# Patient Record
Sex: FEMALE | Race: OTHER | Employment: OTHER | URBAN - METROPOLITAN AREA
[De-identification: names, ages, dates, MRNs, and addresses within clinical notes are randomized per-mention and may not be internally consistent; named-entity substitution may affect disease eponyms.]

---

## 2019-08-27 NOTE — PATIENT DISCUSSION
SUBJECTIVE VISUAL CHANGES AND PHOTOPHOBIA OD - NO EVIDENCE OF RETINAL ISCHEMIA, OPTIC DISC EDEMA, OR UVEITIS. PT HAS PERSONAL HISTORY OF MIGRAINE AND SYMPTOMS ARE CONSISTENT WITH OPHTHALMIC MIGRAINES. CALLBACK INSTRUCTIONS GIVEN. SCHEDULE 30-2 TO ASSESS FIELD CHANGES.

## 2022-07-28 ENCOUNTER — NEW PATIENT (OUTPATIENT)
Dept: URBAN - METROPOLITAN AREA CLINIC 46 | Facility: CLINIC | Age: 72
End: 2022-07-28

## 2022-07-28 DIAGNOSIS — H52.7: ICD-10-CM

## 2022-07-28 DIAGNOSIS — H35.30: ICD-10-CM

## 2022-07-28 DIAGNOSIS — H25.813: ICD-10-CM

## 2022-07-28 PROCEDURE — 92015 DETERMINE REFRACTIVE STATE: CPT

## 2022-07-28 PROCEDURE — 92004 COMPRE OPH EXAM NEW PT 1/>: CPT

## 2022-07-28 ASSESSMENT — VISUAL ACUITY
OD_CC: J2
OS_SC: 20/60+2
OD_SC: 20/40-1
OS_CC: J1
OD_CC: 20/40
OS_CC: 20/20-2
OS_SC: J12
OD_PH: 20/30
OU_SC: 20/40+2
OU_CC: J1+
OU_CC: 20/20-1
OU_SC: J6
OS_BAT: 20/80-1
OD_BAT: 20/100
OD_SC: J8

## 2022-07-28 ASSESSMENT — TONOMETRY
OD_IOP_MMHG: 17
OS_IOP_MMHG: 18

## 2022-11-10 ENCOUNTER — CONSULTATION/EVALUATION (OUTPATIENT)
Dept: URBAN - METROPOLITAN AREA CLINIC 43 | Facility: CLINIC | Age: 72
End: 2022-11-10

## 2022-11-10 DIAGNOSIS — H52.7: ICD-10-CM

## 2022-11-10 DIAGNOSIS — Z98.890: ICD-10-CM

## 2022-11-10 DIAGNOSIS — H25.813: ICD-10-CM

## 2022-11-10 DIAGNOSIS — H04.123: ICD-10-CM

## 2022-11-10 DIAGNOSIS — H35.30: ICD-10-CM

## 2022-11-10 PROCEDURE — 99214 OFFICE O/P EST MOD 30 MIN: CPT

## 2022-11-10 PROCEDURE — 92134 CPTRZ OPH DX IMG PST SGM RTA: CPT

## 2022-11-10 PROCEDURE — 92136TC INTERFEROMETRY - TECHNICAL COMPONENT

## 2022-11-10 PROCEDURE — V2799PMN IMPRIMIS PRED-MOXI-NEPAF 5ML

## 2022-11-10 PROCEDURE — 92025-1 CORNEAL TOPOGRAPHY, INS

## 2022-11-10 ASSESSMENT — VISUAL ACUITY
OD_RAM: 20/20
OD_CC: 20/40
OD_CC: J1
OS_CC: J1
OS_CC: 20/25
OD_SC: 20/40-1
OD_BAT: 20/100
OS_SC: 20/80
OS_BAT: 20/80
OD_SC: J6

## 2022-11-10 ASSESSMENT — TONOMETRY
OD_IOP_MMHG: 14
OS_IOP_MMHG: 13

## 2023-01-09 ENCOUNTER — SURGERY/PROCEDURE (OUTPATIENT)
Dept: URBAN - METROPOLITAN AREA CLINIC 43 | Facility: CLINIC | Age: 73
End: 2023-01-09

## 2023-01-09 DIAGNOSIS — H25.813: ICD-10-CM

## 2023-01-09 PROCEDURE — 66999LNSR LENSAR LASER FOR CAT SX

## 2023-01-09 PROCEDURE — 66984AV REMOVE CATARACT, INSERT ADVANCED LENS

## 2023-01-10 ENCOUNTER — POST-OP (OUTPATIENT)
Dept: URBAN - METROPOLITAN AREA CLINIC 46 | Facility: CLINIC | Age: 73
End: 2023-01-10

## 2023-01-10 DIAGNOSIS — Z96.1: ICD-10-CM

## 2023-01-10 PROCEDURE — P6698455 NON-COMANAGED ADVANCED PO

## 2023-01-10 ASSESSMENT — TONOMETRY
OD_IOP_MMHG: 16
OS_IOP_MMHG: 14

## 2023-01-10 ASSESSMENT — VISUAL ACUITY
OD_SC: 20/30-2
OS_SC: 20/70-1
OD_SC: J3
OS_PH: 20/50
OS_SC: J5

## 2023-01-16 ENCOUNTER — POST OP/EVAL OF SECOND EYE (OUTPATIENT)
Dept: URBAN - METROPOLITAN AREA SURGERY 14 | Facility: SURGERY | Age: 73
End: 2023-01-16

## 2023-01-16 ENCOUNTER — SURGERY/PROCEDURE (OUTPATIENT)
Dept: URBAN - METROPOLITAN AREA CLINIC 43 | Facility: CLINIC | Age: 73
End: 2023-01-16

## 2023-01-16 DIAGNOSIS — H25.811: ICD-10-CM

## 2023-01-16 PROCEDURE — 66999LNSR LENSAR LASER FOR CAT SX

## 2023-01-16 PROCEDURE — 66984AV REMOVE CATARACT, INSERT ADVANCED LENS

## 2023-01-16 ASSESSMENT — TONOMETRY
OD_IOP_MMHG: 16
OS_IOP_MMHG: 14

## 2023-01-16 ASSESSMENT — VISUAL ACUITY
OD_SC: J8
OS_SC: J3
OD_SC: 20/40-1
OS_SC: 20/25

## 2023-01-17 ENCOUNTER — POST-OP (OUTPATIENT)
Dept: URBAN - METROPOLITAN AREA CLINIC 46 | Facility: CLINIC | Age: 73
End: 2023-01-17

## 2023-01-17 DIAGNOSIS — Z96.1: ICD-10-CM

## 2023-01-17 PROCEDURE — 99024 POSTOP FOLLOW-UP VISIT: CPT

## 2023-01-17 ASSESSMENT — VISUAL ACUITY
OS_SC: 20/30-2
OU_SC: J2
OD_SC: J10
OU_SC: 20/40
OD_SC: 20/70-1
OS_SC: J2

## 2023-01-17 ASSESSMENT — TONOMETRY
OS_IOP_MMHG: 14
OD_IOP_MMHG: 17

## 2024-01-04 ENCOUNTER — COMPREHENSIVE EXAM (OUTPATIENT)
Dept: URBAN - METROPOLITAN AREA CLINIC 46 | Facility: CLINIC | Age: 74
End: 2024-01-04

## 2024-01-04 DIAGNOSIS — H26.493: ICD-10-CM

## 2024-01-04 DIAGNOSIS — H04.123: ICD-10-CM

## 2024-01-04 DIAGNOSIS — Z96.1: ICD-10-CM

## 2024-01-04 DIAGNOSIS — H35.30: ICD-10-CM

## 2024-01-04 PROCEDURE — 92134 CPTRZ OPH DX IMG PST SGM RTA: CPT

## 2024-01-04 PROCEDURE — 92014 COMPRE OPH EXAM EST PT 1/>: CPT

## 2024-01-04 ASSESSMENT — VISUAL ACUITY
OS_SC: J2
OD_SC: J2
OS_SC: 20/25
OD_SC: 20/20

## 2024-01-04 ASSESSMENT — TONOMETRY
OD_IOP_MMHG: 16
OS_IOP_MMHG: 14

## 2025-01-14 ENCOUNTER — COMPREHENSIVE EXAM (OUTPATIENT)
Age: 75
End: 2025-01-14

## 2025-01-14 DIAGNOSIS — H04.123: ICD-10-CM

## 2025-01-14 DIAGNOSIS — Z96.1: ICD-10-CM

## 2025-01-14 DIAGNOSIS — H26.493: ICD-10-CM

## 2025-01-14 DIAGNOSIS — H35.30: ICD-10-CM

## 2025-01-14 PROCEDURE — 92014 COMPRE OPH EXAM EST PT 1/>: CPT
